# Patient Record
Sex: FEMALE | Race: WHITE | NOT HISPANIC OR LATINO | URBAN - METROPOLITAN AREA
[De-identification: names, ages, dates, MRNs, and addresses within clinical notes are randomized per-mention and may not be internally consistent; named-entity substitution may affect disease eponyms.]

---

## 2022-09-28 ENCOUNTER — OFFICE (OUTPATIENT)
Dept: URBAN - METROPOLITAN AREA CLINIC 64 | Facility: CLINIC | Age: 57
End: 2022-09-28

## 2022-09-28 VITALS
HEART RATE: 62 BPM | HEIGHT: 69 IN | SYSTOLIC BLOOD PRESSURE: 146 MMHG | WEIGHT: 203 LBS | DIASTOLIC BLOOD PRESSURE: 96 MMHG

## 2022-09-28 DIAGNOSIS — R93.3 ABNORMAL FINDINGS ON DIAGNOSTIC IMAGING OF OTHER PARTS OF DI: ICD-10-CM

## 2022-09-28 DIAGNOSIS — K57.92 DIVERTICULITIS OF INTESTINE, PART UNSPECIFIED, WITHOUT PERFO: ICD-10-CM

## 2022-09-28 DIAGNOSIS — R10.32 LEFT LOWER QUADRANT PAIN: ICD-10-CM

## 2022-09-28 DIAGNOSIS — K52.9 NONINFECTIVE GASTROENTERITIS AND COLITIS, UNSPECIFIED: ICD-10-CM

## 2022-09-28 PROCEDURE — 99204 OFFICE O/P NEW MOD 45 MIN: CPT | Performed by: INTERNAL MEDICINE

## 2022-09-28 RX ORDER — DICYCLOMINE HYDROCHLORIDE 10 MG/1
40 CAPSULE ORAL
Qty: 60 | Refills: 11 | Status: COMPLETED
Start: 2022-09-28 | End: 2022-10-19

## 2022-10-19 ENCOUNTER — ON CAMPUS - OUTPATIENT (OUTPATIENT)
Dept: URBAN - METROPOLITAN AREA HOSPITAL 2 | Facility: HOSPITAL | Age: 57
End: 2022-10-19

## 2022-10-19 VITALS
TEMPERATURE: 96.9 F | SYSTOLIC BLOOD PRESSURE: 130 MMHG | SYSTOLIC BLOOD PRESSURE: 114 MMHG | HEART RATE: 82 BPM | DIASTOLIC BLOOD PRESSURE: 100 MMHG | OXYGEN SATURATION: 98 % | SYSTOLIC BLOOD PRESSURE: 122 MMHG | HEIGHT: 69 IN | DIASTOLIC BLOOD PRESSURE: 52 MMHG | SYSTOLIC BLOOD PRESSURE: 121 MMHG | SYSTOLIC BLOOD PRESSURE: 115 MMHG | DIASTOLIC BLOOD PRESSURE: 94 MMHG | DIASTOLIC BLOOD PRESSURE: 98 MMHG | OXYGEN SATURATION: 97 % | SYSTOLIC BLOOD PRESSURE: 119 MMHG | SYSTOLIC BLOOD PRESSURE: 139 MMHG | DIASTOLIC BLOOD PRESSURE: 79 MMHG | HEART RATE: 70 BPM | HEART RATE: 67 BPM | HEART RATE: 74 BPM | RESPIRATION RATE: 16 BRPM | DIASTOLIC BLOOD PRESSURE: 80 MMHG | RESPIRATION RATE: 72 BRPM | SYSTOLIC BLOOD PRESSURE: 125 MMHG | HEART RATE: 65 BPM | RESPIRATION RATE: 17 BRPM | HEART RATE: 62 BPM | RESPIRATION RATE: 18 BRPM | DIASTOLIC BLOOD PRESSURE: 71 MMHG | HEART RATE: 71 BPM | WEIGHT: 198 LBS

## 2022-10-19 DIAGNOSIS — R10.32 LEFT LOWER QUADRANT PAIN: ICD-10-CM

## 2022-10-19 DIAGNOSIS — K57.92 DIVERTICULITIS OF INTESTINE, PART UNSPECIFIED, WITHOUT PERFO: ICD-10-CM

## 2022-10-19 DIAGNOSIS — K57.30 DIVERTICULOSIS OF LARGE INTESTINE WITHOUT PERFORATION OR ABS: ICD-10-CM

## 2022-10-19 DIAGNOSIS — R93.3 ABNORMAL FINDINGS ON DIAGNOSTIC IMAGING OF OTHER PARTS OF DI: ICD-10-CM

## 2022-10-19 DIAGNOSIS — K52.9 NONINFECTIVE GASTROENTERITIS AND COLITIS, UNSPECIFIED: ICD-10-CM

## 2022-10-19 PROCEDURE — 45378 DIAGNOSTIC COLONOSCOPY: CPT | Performed by: INTERNAL MEDICINE

## 2022-11-14 ENCOUNTER — OFFICE (OUTPATIENT)
Dept: URBAN - METROPOLITAN AREA CLINIC 64 | Facility: CLINIC | Age: 57
End: 2022-11-14

## 2022-11-14 VITALS
SYSTOLIC BLOOD PRESSURE: 178 MMHG | HEIGHT: 69 IN | HEART RATE: 52 BPM | SYSTOLIC BLOOD PRESSURE: 187 MMHG | DIASTOLIC BLOOD PRESSURE: 115 MMHG | DIASTOLIC BLOOD PRESSURE: 112 MMHG | WEIGHT: 203 LBS

## 2022-11-14 DIAGNOSIS — K52.9 NONINFECTIVE GASTROENTERITIS AND COLITIS, UNSPECIFIED: ICD-10-CM

## 2022-11-14 PROCEDURE — 99213 OFFICE O/P EST LOW 20 MIN: CPT | Performed by: INTERNAL MEDICINE

## 2023-05-30 PROBLEM — R94.31 ABNORMAL EKG: Status: ACTIVE | Noted: 2023-05-30

## 2023-05-30 NOTE — PROGRESS NOTES
Date of Office Visit: 2023  Encounter Provider: Dr. Elliott Medina  Place of Service: [facility] CARDIOLOGY [place]  Patient Name: Daniela Alvarez  :1965  Sepideh Coleman MD    Chief Complaint   Patient presents with   • Abnormal ECG   • Consult     History of Present Illness:    I am pleased to see Mrs. Stout in my office today as a new consultation.    As you know, patient is 58 years old white female whose past medical history is significant for hypertension, who is referred to me for symptom of headaches and shortness of breath.    Patient is noted to have elevated blood pressure.  Patient is started on valsartan 320 mg daily.  Patient is also on atenolol.  Her EKG showed sinus bradycardia.  She is complaining of shortness of breath.  Patient denies any chest pain.  Patient denies any syncope or presyncope.  No leg edema noted.    Patient does not have previous history of CAD, PCI or MI.    EKG done in PCP office showed sinus bradycardia.  Left axis deviation noted.    At this stage aggressive blood pressure control is recommended.  I would start hydralazine 25 mg twice daily.  I will change atenolol and Diovan to twice a day regimen.  Echocardiogram would be done.  Salt intake restriction is discussed        Past Medical History:   Diagnosis Date   • Abnormal ECG    • Hypertension          History reviewed. No pertinent surgical history.        Current Outpatient Medications:   •  atenolol (TENORMIN) 100 MG tablet, Take 0.5 tablets by mouth 2 (Two) Times a Day., Disp: , Rfl:   •  esomeprazole (nexIUM) 40 MG capsule, Take 1 capsule by mouth Daily., Disp: , Rfl:   •  furosemide (LASIX) 20 MG tablet, Take 1 tablet by mouth Daily., Disp: , Rfl:   •  NP Thyroid 60 MG tablet, Take 1 tablet by mouth Daily., Disp: , Rfl:   •  valsartan (DIOVAN) 320 MG tablet, Take 0.5 tablets by mouth 2 (Two) Times a Day., Disp: , Rfl:   •  venlafaxine XR (EFFEXOR-XR) 37.5 MG 24 hr capsule, take 1 capsule by mouth every day  "with food, Disp: , Rfl:   •  hydrALAZINE (APRESOLINE) 25 MG tablet, Take 1 tablet by mouth 2 (Two) Times a Day., Disp: 180 tablet, Rfl: 1      Social History     Socioeconomic History   • Marital status: Unknown   Tobacco Use   • Smoking status: Never   • Smokeless tobacco: Never   Vaping Use   • Vaping Use: Never used   Substance and Sexual Activity   • Alcohol use: Yes     Alcohol/week: 1.0 standard drink     Types: 1 Glasses of wine per week   • Drug use: Not Currently   • Sexual activity: Defer         Review of Systems   Constitutional: Negative for chills and fever.   HENT: Negative for ear discharge and nosebleeds.    Eyes: Negative for discharge and redness.   Cardiovascular: Negative for chest pain, orthopnea, palpitations, paroxysmal nocturnal dyspnea and syncope.   Respiratory: Positive for shortness of breath. Negative for cough and wheezing.    Endocrine: Negative for heat intolerance.   Skin: Negative for rash.   Musculoskeletal: Negative for arthritis and myalgias.   Gastrointestinal: Negative for abdominal pain, melena, nausea and vomiting.   Genitourinary: Negative for dysuria and hematuria.   Neurological: Negative for dizziness, light-headedness, numbness and tremors.   Psychiatric/Behavioral: Negative for depression. The patient is not nervous/anxious.        Procedures    Procedures    No orders to display           Objective:    /98 (BP Location: Right arm, Patient Position: Sitting, Cuff Size: Large Adult)   Pulse 56   Ht 175.3 cm (69\")   Wt 89.4 kg (197 lb)   SpO2 97%   BMI 29.09 kg/m²         Constitutional:       Appearance: Well-developed.   Eyes:      General: No scleral icterus.        Right eye: No discharge.   HENT:      Head: Normocephalic and atraumatic.   Neck:      Thyroid: No thyromegaly.      Lymphadenopathy: No cervical adenopathy.   Pulmonary:      Effort: Pulmonary effort is normal. No respiratory distress.      Breath sounds: Normal breath sounds. No wheezing. No " rales.   Cardiovascular:      Normal rate. Regular rhythm.      No gallop.   Abdominal:      Tenderness: There is no abdominal tenderness.   Skin:     Findings: No erythema or rash.   Neurological:      Mental Status: Alert and oriented to person, place, and time.             Assessment:       Diagnosis Plan   1. Abnormal EKG  valsartan (DIOVAN) 320 MG tablet    atenolol (TENORMIN) 100 MG tablet    hydrALAZINE (APRESOLINE) 25 MG tablet    Adult Transthoracic Echo Complete W/ Cont if Necessary Per Protocol      2. Bradycardia, sinus  valsartan (DIOVAN) 320 MG tablet    atenolol (TENORMIN) 100 MG tablet    hydrALAZINE (APRESOLINE) 25 MG tablet    Adult Transthoracic Echo Complete W/ Cont if Necessary Per Protocol      3. Essential hypertension  Adult Transthoracic Echo Complete W/ Cont if Necessary Per Protocol      4. Shortness of breath                 Plan:       MDM:    1.  Hypertension:    Patient is having symptom of shortness of breath.  Blood pressure is elevated I would start hydralazine 25 mg twice daily    2.  Shortness of breath:    Echocardiogram would be done.    3.  Sinus bradycardia:    Patient is asymptomatic patient is on atenolol high-dose.  Currently continue to observe.    4.  Abnormal EKG:    Patient has left axis deviation.  Patient would need stress test in future.

## 2023-05-31 ENCOUNTER — OFFICE VISIT (OUTPATIENT)
Dept: CARDIOLOGY | Facility: CLINIC | Age: 58
End: 2023-05-31

## 2023-05-31 VITALS
HEIGHT: 69 IN | DIASTOLIC BLOOD PRESSURE: 98 MMHG | OXYGEN SATURATION: 97 % | WEIGHT: 197 LBS | BODY MASS INDEX: 29.18 KG/M2 | SYSTOLIC BLOOD PRESSURE: 171 MMHG | HEART RATE: 56 BPM

## 2023-05-31 DIAGNOSIS — I10 ESSENTIAL HYPERTENSION: ICD-10-CM

## 2023-05-31 DIAGNOSIS — R94.31 ABNORMAL EKG: Primary | ICD-10-CM

## 2023-05-31 DIAGNOSIS — R00.1 BRADYCARDIA, SINUS: ICD-10-CM

## 2023-05-31 DIAGNOSIS — R06.02 SHORTNESS OF BREATH: ICD-10-CM

## 2023-05-31 PROCEDURE — 99204 OFFICE O/P NEW MOD 45 MIN: CPT | Performed by: INTERNAL MEDICINE

## 2023-05-31 RX ORDER — LEVOTHYROXINE, LIOTHYRONINE 38; 9 UG/1; UG/1
1 TABLET ORAL DAILY
COMMUNITY
Start: 2023-05-19

## 2023-05-31 RX ORDER — VENLAFAXINE HYDROCHLORIDE 37.5 MG/1
CAPSULE, EXTENDED RELEASE ORAL
COMMUNITY
Start: 2023-05-18

## 2023-05-31 RX ORDER — VALSARTAN 320 MG/1
1 TABLET ORAL DAILY
COMMUNITY
Start: 2023-05-18 | End: 2023-05-31 | Stop reason: SDUPTHER

## 2023-05-31 RX ORDER — FUROSEMIDE 20 MG/1
1 TABLET ORAL DAILY
COMMUNITY
Start: 2023-05-17

## 2023-05-31 RX ORDER — ESOMEPRAZOLE MAGNESIUM 40 MG/1
1 CAPSULE, DELAYED RELEASE ORAL DAILY
COMMUNITY
Start: 2023-05-18

## 2023-05-31 RX ORDER — ATENOLOL 100 MG/1
50 TABLET ORAL 2 TIMES DAILY
Start: 2023-05-31

## 2023-05-31 RX ORDER — HYDRALAZINE HYDROCHLORIDE 25 MG/1
25 TABLET, FILM COATED ORAL 2 TIMES DAILY
Qty: 180 TABLET | Refills: 1 | Status: SHIPPED | OUTPATIENT
Start: 2023-05-31

## 2023-05-31 RX ORDER — VALSARTAN 320 MG/1
160 TABLET ORAL 2 TIMES DAILY
Start: 2023-05-31

## 2023-05-31 RX ORDER — AMLODIPINE BESYLATE 5 MG/1
1 TABLET ORAL DAILY
COMMUNITY
Start: 2023-05-18 | End: 2023-05-31

## 2023-05-31 RX ORDER — ATENOLOL 100 MG/1
TABLET ORAL
COMMUNITY
Start: 2023-05-18 | End: 2023-05-31 | Stop reason: SDUPTHER

## 2023-08-03 ENCOUNTER — OFFICE VISIT (OUTPATIENT)
Dept: CARDIOLOGY | Facility: CLINIC | Age: 58
End: 2023-08-03
Payer: COMMERCIAL

## 2023-08-03 VITALS
HEART RATE: 60 BPM | HEIGHT: 69 IN | DIASTOLIC BLOOD PRESSURE: 85 MMHG | WEIGHT: 197 LBS | SYSTOLIC BLOOD PRESSURE: 139 MMHG | OXYGEN SATURATION: 97 % | BODY MASS INDEX: 29.18 KG/M2

## 2023-08-03 DIAGNOSIS — R00.1 BRADYCARDIA, SINUS: ICD-10-CM

## 2023-08-03 DIAGNOSIS — I10 ESSENTIAL HYPERTENSION: ICD-10-CM

## 2023-08-03 DIAGNOSIS — R94.31 ABNORMAL EKG: Primary | ICD-10-CM

## 2023-08-03 PROCEDURE — 99213 OFFICE O/P EST LOW 20 MIN: CPT | Performed by: INTERNAL MEDICINE

## 2023-08-03 RX ORDER — HYDRALAZINE HYDROCHLORIDE 50 MG/1
50 TABLET, FILM COATED ORAL 2 TIMES DAILY
Qty: 180 TABLET | Refills: 1 | Status: SHIPPED | OUTPATIENT
Start: 2023-08-03

## 2023-08-03 RX ORDER — VALSARTAN 160 MG/1
160 TABLET ORAL 2 TIMES DAILY
Qty: 180 TABLET | Refills: 1
Start: 2023-08-03

## 2023-12-05 ENCOUNTER — TRANSCRIBE ORDERS (OUTPATIENT)
Dept: ADMINISTRATIVE | Facility: HOSPITAL | Age: 58
End: 2023-12-05
Payer: COMMERCIAL

## 2023-12-05 DIAGNOSIS — I15.0 RENOVASCULAR HYPERTENSION: Primary | ICD-10-CM

## 2023-12-13 ENCOUNTER — HOSPITAL ENCOUNTER (OUTPATIENT)
Dept: CARDIOLOGY | Facility: HOSPITAL | Age: 58
Discharge: HOME | End: 2023-12-13
Admitting: INTERNAL MEDICINE
Payer: COMMERCIAL

## 2023-12-13 DIAGNOSIS — I15.0 RENOVASCULAR HYPERTENSION: ICD-10-CM

## 2023-12-13 LAB
BH CV ECHO MEAS - DIST REN A EDV LEFT: 42.2 CM/S
BH CV ECHO MEAS - DIST REN A PSV LEFT: 122 CM/S
BH CV ECHO MEAS - MID REN A EDV LEFT: 33.9 CM/S
BH CV ECHO MEAS - MID REN A PSV LEFT: 121 CM/S
BH CV ECHO MEAS - PROX REN A EDV LEFT: -23.3 CM/S
BH CV ECHO MEAS - PROX REN A PSV LEFT: -82.1 CM/S
BH CV VAS RENAL AORTIC MID PSV: 79 CM/S
BH CV VAS RENAL HILUM LEFT EDV: 16.6 CM/S
BH CV VAS RENAL HILUM LEFT PSV: 50.8 CM/S
BH CV VAS RENAL HILUM RIGHT EDV: 15.5 CM/S
BH CV VAS RENAL HILUM RIGHT PSV: 44.7 CM/S
BH CV XLRA MEAS - KID L LEFT: 9.8 CM
BH CV XLRA MEAS DIST REN A EDV RIGHT: 37 CM/S
BH CV XLRA MEAS DIST REN A PSV RIGHT: 87.1 CM/S
BH CV XLRA MEAS KID L RIGHT: 8.8 CM
BH CV XLRA MEAS MID REN A EDV RIGHT: 41.7 CM/S
BH CV XLRA MEAS MID REN A PSV RIGHT: 112 CM/S
BH CV XLRA MEAS PROX REN A EDV RIGHT: 30.7 CM/S
BH CV XLRA MEAS PROX REN A PSV RIGHT: 96 CM/S
BH CV XLRA MEAS RAR LEFT: 1.54
BH CV XLRA MEAS RAR RIGHT: 1.42
BH CV XLRA MEAS RENAL A ORG EDV LEFT: 26.9 CM/S
BH CV XLRA MEAS RENAL A ORG EDV RIGHT: 23.4 CM/S
BH CV XLRA MEAS RENAL A ORG PSV LEFT: 69.4 CM/S
BH CV XLRA MEAS RENAL A ORG PSV RIGHT: 72.5 CM/S
LEFT RENAL UPPER PARENCHYMA MAX: 26 CM/S
LEFT RENAL UPPER PARENCHYMA MIN: 10 CM/S
LEFT RENAL UPPER PARENCHYMA RI: 0.62
RIGHT RENAL UPPER PARENCHYMA MAX: 28 CM/S
RIGHT RENAL UPPER PARENCHYMA MIN: 11 CM/S
RIGHT RENAL UPPER PARENCHYMA RI: 0.61

## 2023-12-13 PROCEDURE — 93975 VASCULAR STUDY: CPT

## 2024-02-05 NOTE — PROGRESS NOTES
Date of Office Visit: 2024  Encounter Provider: Elliott Hayward  Place of Service: [facility] CARDIOLOGY [place]  Patient Name: Daniela Alvarez  :1965  Sepideh Coleman MD    Chief Complaint   Patient presents with    Hypertension    Follow-up     History of Present Illness    I am pleased to see Mrs. Stout in my office today as a follow-up    As you know, patient is 58 years old white female whose past medical history is significant for hypertension, who came today for follow-up.    Patient is noted to have elevated blood pressure.  Patient is started on valsartan 320 mg daily.  Patient is also on atenolol.  Her EKG showed sinus bradycardia..Patient does not have previous history of CAD, PCI or MI.    Echocardiogram showed normal left ventricular size and function with EF of 55 to 60%.  No significant valvular heart disease noted.  Mild tricuspid regurgitation noted.    At this stage, patient is overall doing well but blood pressures are still on the higher side.  Patient does complain of occasional chest pain on the left side of the chest with radiation to the shoulder.  Patient denies any orthopnea, PND, syncope or presyncope.  No leg edema noted.    EKG showed sinus rhythm LVH and ST and T wave changes are noted.    Patient blood pressure is still high I would increase hydralazine to 75 mg twice daily.  I would proceed with stress test with Cardiolite imaging.  Consider adding Procardia in future.      Past Medical History:   Diagnosis Date    Abnormal ECG     Hypertension          History reviewed. No pertinent surgical history.        Current Outpatient Medications:     atenolol (TENORMIN) 100 MG tablet, Take 0.5 tablets by mouth 2 (Two) Times a Day., Disp: , Rfl:     esomeprazole (nexIUM) 40 MG capsule, Take 1 capsule by mouth Daily., Disp: , Rfl:     furosemide (LASIX) 20 MG tablet, Take 1 tablet by mouth Daily., Disp: , Rfl:     hydrALAZINE (APRESOLINE) 50 MG tablet, Take 1.5 tablets by mouth  "2 (Two) Times a Day., Disp: 270 tablet, Rfl: 1    NP Thyroid 60 MG tablet, Take 1 tablet by mouth Daily., Disp: , Rfl:     valsartan (DIOVAN) 160 MG tablet, Take 1 tablet by mouth 2 (Two) Times a Day., Disp: 180 tablet, Rfl: 1    venlafaxine XR (EFFEXOR-XR) 37.5 MG 24 hr capsule, take 1 capsule by mouth every day with food, Disp: , Rfl:       Social History     Socioeconomic History    Marital status:    Tobacco Use    Smoking status: Never    Smokeless tobacco: Never   Vaping Use    Vaping Use: Never used   Substance and Sexual Activity    Alcohol use: Yes     Alcohol/week: 1.0 standard drink of alcohol     Types: 1 Glasses of wine per week    Drug use: Not Currently    Sexual activity: Defer         Review of Systems   Constitutional: Negative for chills and fever.   HENT:  Negative for ear discharge and nosebleeds.    Eyes:  Negative for discharge and redness.   Cardiovascular:  Negative for chest pain, orthopnea, palpitations, paroxysmal nocturnal dyspnea and syncope.   Respiratory:  Negative for cough, shortness of breath and wheezing.    Endocrine: Negative for heat intolerance.   Skin:  Negative for rash.   Musculoskeletal:  Negative for arthritis and myalgias.   Gastrointestinal:  Negative for abdominal pain, melena, nausea and vomiting.   Genitourinary:  Negative for dysuria and hematuria.   Neurological:  Negative for dizziness, light-headedness, numbness and tremors.   Psychiatric/Behavioral:  Negative for depression. The patient is not nervous/anxious.        Procedures      ECG 12 Lead    Date/Time: 2/7/2024 2:19 PM  Performed by: Elliott Medina MD    Authorized by: Elliott Medina MD  Comparison: compared with previous ECG   Similar to previous ECG  Rhythm: sinus rhythm  Other findings: non-specific ST-T wave changes and T wave abnormality    Clinical impression: abnormal EKG          ECG 12 Lead    (Results Pending)           Objective:    /80   Pulse 64   Resp 16   Ht 175.3 cm (69.02\")  "  Wt 88.9 kg (196 lb)   SpO2 97%   BMI 28.93 kg/m²         Constitutional:       Appearance: Well-developed.   Eyes:      General: No scleral icterus.        Right eye: No discharge.   HENT:      Head: Normocephalic and atraumatic.   Neck:      Thyroid: No thyromegaly.      Lymphadenopathy: No cervical adenopathy.   Pulmonary:      Effort: Pulmonary effort is normal. No respiratory distress.      Breath sounds: Normal breath sounds. No wheezing. No rales.   Cardiovascular:      Normal rate. Regular rhythm.      No gallop.    Edema:     Peripheral edema absent.   Abdominal:      Tenderness: There is no abdominal tenderness.   Skin:     Findings: No erythema or rash.   Neurological:      Mental Status: Alert and oriented to person, place, and time.             Assessment:       Diagnosis Plan   1. Abnormal EKG  ECG 12 Lead    Stress Test With Myocardial Perfusion One Day    hydrALAZINE (APRESOLINE) 50 MG tablet      2. Essential hypertension  Stress Test With Myocardial Perfusion One Day      3. Chest discomfort  Stress Test With Myocardial Perfusion One Day      4. Bradycardia, sinus  hydrALAZINE (APRESOLINE) 50 MG tablet               Plan:       MDM:    1.  Hypertension:    Patient had aggressive blood pressure.  Renal artery stenosis has been excluded.  I would increase hydralazine to 75 mg twice daily.    2.  Chest discomfort:    Proceed with stress test with Cardiolite imaging    3.  Abnormal EKG:    This could be due to hypertension but proceed with stress test

## 2024-02-07 ENCOUNTER — OFFICE VISIT (OUTPATIENT)
Dept: CARDIOLOGY | Facility: CLINIC | Age: 59
End: 2024-02-07
Payer: COMMERCIAL

## 2024-02-07 VITALS
WEIGHT: 196 LBS | HEART RATE: 64 BPM | DIASTOLIC BLOOD PRESSURE: 80 MMHG | HEIGHT: 69 IN | SYSTOLIC BLOOD PRESSURE: 139 MMHG | OXYGEN SATURATION: 97 % | RESPIRATION RATE: 16 BRPM | BODY MASS INDEX: 29.03 KG/M2

## 2024-02-07 DIAGNOSIS — R07.89 CHEST DISCOMFORT: ICD-10-CM

## 2024-02-07 DIAGNOSIS — I10 ESSENTIAL HYPERTENSION: ICD-10-CM

## 2024-02-07 DIAGNOSIS — R94.31 ABNORMAL EKG: Primary | ICD-10-CM

## 2024-02-07 DIAGNOSIS — R00.1 BRADYCARDIA, SINUS: ICD-10-CM

## 2024-02-07 PROCEDURE — 93000 ELECTROCARDIOGRAM COMPLETE: CPT | Performed by: INTERNAL MEDICINE

## 2024-02-07 PROCEDURE — 99214 OFFICE O/P EST MOD 30 MIN: CPT | Performed by: INTERNAL MEDICINE

## 2024-02-07 RX ORDER — HYDRALAZINE HYDROCHLORIDE 50 MG/1
75 TABLET, FILM COATED ORAL 2 TIMES DAILY
Qty: 270 TABLET | Refills: 1 | Status: SHIPPED | OUTPATIENT
Start: 2024-02-07

## 2024-03-07 ENCOUNTER — HOSPITAL ENCOUNTER (OUTPATIENT)
Dept: NUCLEAR MEDICINE | Facility: HOSPITAL | Age: 59
Discharge: HOME | End: 2024-03-07
Payer: COMMERCIAL

## 2024-03-07 DIAGNOSIS — R94.31 ABNORMAL EKG: ICD-10-CM

## 2024-03-07 DIAGNOSIS — R07.89 CHEST DISCOMFORT: ICD-10-CM

## 2024-03-07 DIAGNOSIS — I10 ESSENTIAL HYPERTENSION: ICD-10-CM

## 2024-03-07 PROCEDURE — 0 TECHNETIUM TETROFOSMIN KIT: Performed by: INTERNAL MEDICINE

## 2024-03-07 PROCEDURE — 78452 HT MUSCLE IMAGE SPECT MULT: CPT

## 2024-03-07 PROCEDURE — A9502 TC99M TETROFOSMIN: HCPCS | Performed by: INTERNAL MEDICINE

## 2024-03-07 PROCEDURE — 93017 CV STRESS TEST TRACING ONLY: CPT

## 2024-03-07 RX ADMIN — TETROFOSMIN 1 DOSE: 1.38 INJECTION, POWDER, LYOPHILIZED, FOR SOLUTION INTRAVENOUS at 08:58

## 2024-03-07 RX ADMIN — TETROFOSMIN 1 DOSE: 1.38 INJECTION, POWDER, LYOPHILIZED, FOR SOLUTION INTRAVENOUS at 10:17

## 2024-03-09 LAB
BH CV NUCLEAR PRIOR STUDY: 3
BH CV REST NUCLEAR ISOTOPE DOSE: 11 MCI
BH CV STRESS BP STAGE 1: NORMAL
BH CV STRESS BP STAGE 2: NORMAL
BH CV STRESS DURATION MIN STAGE 1: 3
BH CV STRESS DURATION MIN STAGE 2: 3
BH CV STRESS DURATION SEC STAGE 1: 0
BH CV STRESS DURATION SEC STAGE 2: 0
BH CV STRESS GRADE STAGE 1: 10
BH CV STRESS GRADE STAGE 2: 12
BH CV STRESS HR STAGE 1: 101
BH CV STRESS HR STAGE 2: 136
BH CV STRESS METS STAGE 1: 5
BH CV STRESS METS STAGE 2: 7.5
BH CV STRESS NUCLEAR ISOTOPE DOSE: 33 MCI
BH CV STRESS PROTOCOL 1: NORMAL
BH CV STRESS RECOVERY BP: NORMAL MMHG
BH CV STRESS RECOVERY HR: 79 BPM
BH CV STRESS SPEED STAGE 1: 1.7
BH CV STRESS SPEED STAGE 2: 2.5
BH CV STRESS STAGE 1: 1
BH CV STRESS STAGE 2: 2
LV EF NUC BP: 72 %
MAXIMAL PREDICTED HEART RATE: 160 BPM
PERCENT MAX PREDICTED HR: 87.5 %
STRESS BASELINE BP: NORMAL MMHG
STRESS BASELINE HR: 65 BPM
STRESS PERCENT HR: 103 %
STRESS POST ESTIMATED WORKLOAD: 9 METS
STRESS POST EXERCISE DUR MIN: 7 MIN
STRESS POST EXERCISE DUR SEC: 59 SEC
STRESS POST PEAK HR: 140 BPM
STRESS TARGET HR: 136 BPM

## 2024-04-05 DIAGNOSIS — R00.1 BRADYCARDIA, SINUS: ICD-10-CM

## 2024-04-05 DIAGNOSIS — R94.31 ABNORMAL EKG: ICD-10-CM

## 2024-04-05 RX ORDER — HYDRALAZINE HYDROCHLORIDE 50 MG/1
50 TABLET, FILM COATED ORAL 2 TIMES DAILY
Qty: 180 TABLET | Refills: 1 | Status: SHIPPED | OUTPATIENT
Start: 2024-04-05

## 2024-09-09 ENCOUNTER — OFFICE VISIT (OUTPATIENT)
Dept: CARDIOLOGY | Facility: CLINIC | Age: 59
End: 2024-09-09
Payer: COMMERCIAL

## 2024-09-09 VITALS
OXYGEN SATURATION: 97 % | SYSTOLIC BLOOD PRESSURE: 130 MMHG | WEIGHT: 196 LBS | BODY MASS INDEX: 29.03 KG/M2 | HEIGHT: 69 IN | DIASTOLIC BLOOD PRESSURE: 87 MMHG | RESPIRATION RATE: 18 BRPM | HEART RATE: 54 BPM

## 2024-09-09 DIAGNOSIS — I10 PRIMARY HYPERTENSION: Primary | ICD-10-CM

## 2024-09-09 PROCEDURE — 93000 ELECTROCARDIOGRAM COMPLETE: CPT | Performed by: NURSE PRACTITIONER

## 2024-09-09 PROCEDURE — 99213 OFFICE O/P EST LOW 20 MIN: CPT | Performed by: NURSE PRACTITIONER

## 2024-09-09 RX ORDER — CARVEDILOL 12.5 MG/1
6.25 TABLET ORAL 2 TIMES DAILY
Qty: 60 TABLET | Refills: 11 | Status: SHIPPED | OUTPATIENT
Start: 2024-09-09

## 2024-09-10 NOTE — PROGRESS NOTES
Cardiology Office Follow Up Visit      Primary Care Provider:  Sepideh Coleman MD    Reason for f/u:     Hypertension      Subjective     CC:    Denies chest pain or dyspnea    History of Present Illness       Daniela Alvarez is a 59 y.o. female.  Patient is a very pleasant 59-year-old female who is known to have hypertension.    Patient has been previously treated with valsartan, atenolol and most recently hydralazine added.    Patient has had bradycardia limiting titration of beta-blocker therapy.    Previous echocardiogram demonstrated ejection fraction of 55 to 60% with mild TR otherwise no significant valvular flow abnormalities.    Patient denies any current or recent chest pain, dyspnea, PND, orthopnea, palpitations, near syncope, lower extremity edema or feelings of her heart racing she reports compliance to medical therapy.    She brings her home blood pressure monitor and shows that consistently her systolic blood pressures are in the 140s to 150s over 90s to low 100s.              ASSESSMENT/PLAN:      Diagnoses and all orders for this visit:    1. Primary hypertension (Primary)    Other orders  -     carvedilol (COREG) 12.5 MG tablet; Take 0.5 tablets by mouth 2 (Two) Times a Day.  Dispense: 60 tablet; Refill: 11            MEDICAL DECISION MAKING:    I rechecked her patient's blood pressure manually myself on her blood pressure was approximately 148/92.    Would like to make adjustments in medical therapy.  She is currently taking atenolol 50 mg twice daily but her heart rate is in the 50s limiting additional titration.    She expresses some frustration about the number of blood pressure medications she is on.  We have discussed considering changing her atenolol to carvedilol and seeing how she tolerates this.    I given her prescription for carvedilol 12.5 mg twice daily and instructed her to start we will start with 1/2 tablet twice daily.  I have asked her to log her blood pressure and heart rate over  the next week and report them to me.  We will uptitrate if tolerated and needed for blood pressure control.    Patient's been advised to contact the office if there is any new symptoms.  EKG today shows sinus bradycardia with incomplete right bundle branch block.    I have scheduled her for an office follow-up in 6 months but we plan to discuss her blood pressures over MyChart in the coming weeks.        Past Medical History:   Diagnosis Date    Abnormal ECG     Hypertension        History reviewed. No pertinent surgical history.      Current Outpatient Medications:     esomeprazole (nexIUM) 40 MG capsule, Take 1 capsule by mouth Daily., Disp: , Rfl:     furosemide (LASIX) 20 MG tablet, Take 1 tablet by mouth Daily., Disp: , Rfl:     hydrALAZINE (APRESOLINE) 50 MG tablet, TAKE 1 TABLET BY MOUTH TWICE DAILY, Disp: 180 tablet, Rfl: 1    NP Thyroid 60 MG tablet, Take 1 tablet by mouth Daily., Disp: , Rfl:     valsartan (DIOVAN) 160 MG tablet, Take 1 tablet by mouth 2 (Two) Times a Day., Disp: 180 tablet, Rfl: 1    venlafaxine XR (EFFEXOR-XR) 37.5 MG 24 hr capsule, take 1 capsule by mouth every day with food, Disp: , Rfl:     carvedilol (COREG) 12.5 MG tablet, Take 0.5 tablets by mouth 2 (Two) Times a Day., Disp: 60 tablet, Rfl: 11    Social History     Socioeconomic History    Marital status:    Tobacco Use    Smoking status: Never     Passive exposure: Never    Smokeless tobacco: Never   Vaping Use    Vaping status: Never Used   Substance and Sexual Activity    Alcohol use: Yes     Alcohol/week: 1.0 standard drink of alcohol     Types: 1 Glasses of wine per week    Drug use: Not Currently    Sexual activity: Defer       Family History   Problem Relation Age of Onset    Heart disease Father        The following portions of the patient's history were reviewed and updated as appropriate: allergies, current medications, past family history, past medical history, past social history, past surgical history and  "problem list.    Review of Systems   All other systems reviewed and are negative.      Pertinent items are noted in HPI, all other systems reviewed and negative    /87 (BP Location: Right arm, Patient Position: Sitting, Cuff Size: Adult)   Pulse 54   Resp 18   Ht 175.3 cm (69\")   Wt 88.9 kg (196 lb)   SpO2 97%   BMI 28.94 kg/m² .  Objective     Constitutional:       General: Not in acute distress.     Appearance: Normal appearance. Well-developed.   Eyes:      Pupils: Pupils are equal, round, and reactive to light.   HENT:      Head: Normocephalic and atraumatic.   Neck:      Vascular: No JVD.   Pulmonary:      Effort: Pulmonary effort is normal.      Breath sounds: Normal breath sounds.   Cardiovascular:      Normal rate. Regular rhythm.   Edema:     Peripheral edema absent.   Abdominal:      General: There is no distension.      Palpations: Abdomen is soft.      Tenderness: There is no abdominal tenderness.   Musculoskeletal: Normal range of motion.      Cervical back: Normal range of motion and neck supple. Skin:     General: Skin is warm and dry.   Neurological:      Mental Status: Alert and oriented to person, place, and time.             ECG 12 Lead    Date/Time: 9/9/2024 8:10 AM  Performed by: Ruby Alexandra APRN    Authorized by: Ruby Alexandra APRN  Comparison: compared with previous ECG   Similar to previous ECG  Rhythm: sinus bradycardia  BPM: 54  Conduction: incomplete right bundle branch block and left anterior fascicular block  T inversion: II and III  Comments: Unchanged from previous EKG          EKG ordered by and reviewed by me in office                        "

## 2024-10-19 DIAGNOSIS — R94.31 ABNORMAL EKG: ICD-10-CM

## 2024-10-19 DIAGNOSIS — R00.1 BRADYCARDIA, SINUS: ICD-10-CM

## 2024-10-21 RX ORDER — CARVEDILOL 12.5 MG/1
6.25 TABLET ORAL 2 TIMES DAILY
Qty: 60 TABLET | Refills: 11 | Status: SHIPPED | OUTPATIENT
Start: 2024-10-21

## 2024-10-21 RX ORDER — VALSARTAN 160 MG/1
160 TABLET ORAL 2 TIMES DAILY
Qty: 180 TABLET | Refills: 1
Start: 2024-10-21

## 2024-10-21 RX ORDER — HYDRALAZINE HYDROCHLORIDE 50 MG/1
50 TABLET, FILM COATED ORAL 2 TIMES DAILY
Qty: 180 TABLET | Refills: 1 | Status: SHIPPED | OUTPATIENT
Start: 2024-10-21

## 2025-03-12 NOTE — PROGRESS NOTES
Date of Office Visit: 2025  Encounter Provider: Dr. Elliott Medina  Place of Service: [facility] CARDIOLOGY [place]  Patient Name: Daniela Alvarez  :1965  Sepideh Coleman MD    Chief Complaint   Patient presents with    Hypertension    Follow-up     History of Present Illness:    I am pleased to see Mrs. Stout in my office today as a follow-up    As you know, patient is 59 years old white female whose past medical history is significant for hypertension, who came today for follow-up.    Patient is noted to have elevated blood pressure.  Patient is started on valsartan 320 mg daily.  Patient is also on atenolol.  Her EKG showed sinus bradycardia..Patient does not have previous history of CAD, PCI or MI.    In , echocardiogram showed normal left ventricular size and function with EF of 55 to 60%.  No significant valvular heart disease noted.  Mild tricuspid regurgitation noted.  In , patient underwent stress test which was negative for ischemia or myocardial infarction.    Patient came today for follow-up.  On previous visit her blood pressure was noted to be elevated.  Patient was recommended to increase carvedilol.  She brought the blood pressure log book and blood pressures are much better.  Patient denies any chest pain or tightness or heaviness.  No orthopnea PND no syncope or presyncope.    At this stage I would recommend to change valsartan 160 mg twice daily.  Monitor the blood pressure at home cardiac workup is negative.  If patient blood pressure continues to do well on next visit I will see the patient as needed.      Past Medical History:   Diagnosis Date    Abnormal ECG     Hypertension          History reviewed. No pertinent surgical history.        Current Outpatient Medications:     carvedilol (COREG) 12.5 MG tablet, Take 0.5 tablets by mouth 2 (Two) Times a Day., Disp: 60 tablet, Rfl: 11    esomeprazole (nexIUM) 40 MG capsule, Take 1 capsule by mouth Daily., Disp: , Rfl:      "furosemide (LASIX) 20 MG tablet, Take 1 tablet by mouth Daily., Disp: , Rfl:     hydrALAZINE (APRESOLINE) 50 MG tablet, Take 1 tablet by mouth 2 (Two) Times a Day., Disp: 180 tablet, Rfl: 1    NP Thyroid 60 MG tablet, Take 1 tablet by mouth Daily., Disp: , Rfl:     valsartan (DIOVAN) 160 MG tablet, Take 1 tablet by mouth 2 (Two) Times a Day., Disp: 180 tablet, Rfl: 1    venlafaxine XR (EFFEXOR-XR) 37.5 MG 24 hr capsule, take 1 capsule by mouth every day with food, Disp: , Rfl:       Social History     Socioeconomic History    Marital status:    Tobacco Use    Smoking status: Never     Passive exposure: Never    Smokeless tobacco: Never   Vaping Use    Vaping status: Never Used   Substance and Sexual Activity    Alcohol use: Yes     Alcohol/week: 1.0 standard drink of alcohol     Types: 1 Glasses of wine per week    Drug use: Not Currently    Sexual activity: Defer         Review of Systems   Constitutional: Negative for chills and fever.   HENT:  Negative for ear discharge and nosebleeds.    Eyes:  Negative for discharge and redness.   Cardiovascular:  Negative for chest pain, orthopnea, palpitations, paroxysmal nocturnal dyspnea and syncope.   Respiratory:  Negative for cough, shortness of breath and wheezing.    Endocrine: Negative for heat intolerance.   Skin:  Negative for rash.   Musculoskeletal:  Negative for arthritis and myalgias.   Gastrointestinal:  Negative for abdominal pain, melena, nausea and vomiting.   Genitourinary:  Negative for dysuria and hematuria.   Neurological:  Negative for dizziness, light-headedness, numbness and tremors.   Psychiatric/Behavioral:  Negative for depression. The patient is not nervous/anxious.        Procedures    Procedures    No orders to display           Objective:    /88 (BP Location: Right arm, Patient Position: Sitting, Cuff Size: Large Adult)   Pulse 78   Resp 18   Ht 175 cm (68.9\")   Wt 91.2 kg (201 lb)   SpO2 94%   BMI 29.77 kg/m² "         Constitutional:       Appearance: Well-developed.   Eyes:      General: No scleral icterus.        Right eye: No discharge.   HENT:      Head: Normocephalic and atraumatic.   Neck:      Thyroid: No thyromegaly.      Lymphadenopathy: No cervical adenopathy.   Pulmonary:      Effort: Pulmonary effort is normal. No respiratory distress.      Breath sounds: Normal breath sounds. No wheezing. No rales.   Cardiovascular:      Normal rate. Regular rhythm.      No gallop.    Edema:     Peripheral edema absent.   Abdominal:      Tenderness: There is no abdominal tenderness.   Skin:     Findings: No erythema or rash.   Neurological:      Mental Status: Alert and oriented to person, place, and time.             Assessment:       Diagnosis Plan   1. Primary hypertension        2. Abnormal EKG                 Plan:       MDM:    1.  Hypertension:    Blood pressure is better controlled on current regimen    2.  Abnormal EKG:    Patient underwent stress test and it was negative for ischemia or myocardial infarction continue to observe

## 2025-03-13 ENCOUNTER — OFFICE VISIT (OUTPATIENT)
Dept: CARDIOLOGY | Facility: CLINIC | Age: 60
End: 2025-03-13
Payer: COMMERCIAL

## 2025-03-13 VITALS
HEART RATE: 78 BPM | OXYGEN SATURATION: 94 % | WEIGHT: 201 LBS | RESPIRATION RATE: 18 BRPM | DIASTOLIC BLOOD PRESSURE: 88 MMHG | SYSTOLIC BLOOD PRESSURE: 127 MMHG | HEIGHT: 69 IN | BODY MASS INDEX: 29.77 KG/M2

## 2025-03-13 DIAGNOSIS — I10 PRIMARY HYPERTENSION: Primary | ICD-10-CM

## 2025-03-13 DIAGNOSIS — R94.31 ABNORMAL EKG: ICD-10-CM

## 2025-03-13 PROCEDURE — 99213 OFFICE O/P EST LOW 20 MIN: CPT | Performed by: INTERNAL MEDICINE

## 2025-05-31 DIAGNOSIS — R00.1 BRADYCARDIA, SINUS: ICD-10-CM

## 2025-05-31 DIAGNOSIS — R94.31 ABNORMAL EKG: ICD-10-CM

## 2025-06-02 RX ORDER — HYDRALAZINE HYDROCHLORIDE 50 MG/1
50 TABLET, FILM COATED ORAL 2 TIMES DAILY
Qty: 180 TABLET | Refills: 3 | Status: SHIPPED | OUTPATIENT
Start: 2025-06-02

## 2025-07-07 DIAGNOSIS — R00.1 BRADYCARDIA, SINUS: ICD-10-CM

## 2025-07-07 DIAGNOSIS — R94.31 ABNORMAL EKG: ICD-10-CM

## 2025-07-07 RX ORDER — HYDRALAZINE HYDROCHLORIDE 50 MG/1
50 TABLET, FILM COATED ORAL 2 TIMES DAILY
Qty: 180 TABLET | Refills: 3 | Status: SHIPPED | OUTPATIENT
Start: 2025-07-07